# Patient Record
Sex: FEMALE | Race: OTHER | Employment: OTHER | URBAN - METROPOLITAN AREA
[De-identification: names, ages, dates, MRNs, and addresses within clinical notes are randomized per-mention and may not be internally consistent; named-entity substitution may affect disease eponyms.]

---

## 2018-06-25 ENCOUNTER — HOSPITAL ENCOUNTER (OUTPATIENT)
Dept: CT IMAGING | Age: 63
Discharge: HOME | End: 2018-06-25

## 2018-06-25 DIAGNOSIS — Z13.6 SCREENING FOR CARDIOVASCULAR CONDITION: ICD-10-CM

## 2018-06-25 NOTE — PROGRESS NOTES
Pt seen at [facility] for [facility]:  PRELIMINARY SCORE= 10.19  BP= 122/84  [facility] labs as follows: Fasting  TC= 159  HDL= 51  LDL= 84  TG= 121  GLUCOSE= 93  All results and risk factors discussed with patient; all questions and concerns addressed.   Educ

## 2023-04-28 ENCOUNTER — ORDER TRANSCRIPTION (OUTPATIENT)
Dept: ADMINISTRATIVE | Facility: HOSPITAL | Age: 68
End: 2023-04-28

## 2023-04-28 DIAGNOSIS — Z13.6 SCREENING FOR CARDIOVASCULAR CONDITION: Primary | ICD-10-CM

## 2023-06-06 ENCOUNTER — HOSPITAL ENCOUNTER (OUTPATIENT)
Dept: CT IMAGING | Age: 68
Discharge: HOME | End: 2023-06-06
Attending: FAMILY MEDICINE

## 2023-06-06 DIAGNOSIS — Z13.6 SCREENING FOR CARDIOVASCULAR CONDITION: ICD-10-CM

## 2023-11-14 ENCOUNTER — HOSPITAL ENCOUNTER (OUTPATIENT)
Dept: ULTRASOUND IMAGING | Age: 68
Discharge: HOME | End: 2023-11-14
Attending: FAMILY MEDICINE
Payer: MEDICARE

## 2023-11-14 DIAGNOSIS — R19.7 DIARRHEA, UNSPECIFIED TYPE: ICD-10-CM

## 2023-11-14 PROCEDURE — 76700 US EXAM ABDOM COMPLETE: CPT | Performed by: FAMILY MEDICINE

## 2023-11-15 ENCOUNTER — OFFICE VISIT (OUTPATIENT)
Dept: GASTROENTEROLOGY | Facility: CLINIC | Age: 68
End: 2023-11-15

## 2023-11-15 ENCOUNTER — TELEPHONE (OUTPATIENT)
Dept: GASTROENTEROLOGY | Facility: CLINIC | Age: 68
End: 2023-11-15

## 2023-11-15 VITALS
SYSTOLIC BLOOD PRESSURE: 154 MMHG | BODY MASS INDEX: 40.18 KG/M2 | HEART RATE: 72 BPM | HEIGHT: 66 IN | WEIGHT: 250 LBS | DIASTOLIC BLOOD PRESSURE: 89 MMHG

## 2023-11-15 DIAGNOSIS — R93.89 ABNORMAL ULTRASOUND: ICD-10-CM

## 2023-11-15 DIAGNOSIS — Z12.11 COLON CANCER SCREENING: Primary | ICD-10-CM

## 2023-11-15 DIAGNOSIS — K21.9 GASTROESOPHAGEAL REFLUX DISEASE, UNSPECIFIED WHETHER ESOPHAGITIS PRESENT: ICD-10-CM

## 2023-11-15 DIAGNOSIS — K76.0 HEPATIC STEATOSIS: ICD-10-CM

## 2023-11-15 DIAGNOSIS — R19.7 DIARRHEA, UNSPECIFIED TYPE: ICD-10-CM

## 2023-11-15 DIAGNOSIS — K21.9 GASTROESOPHAGEAL REFLUX DISEASE WITHOUT ESOPHAGITIS: ICD-10-CM

## 2023-11-15 PROCEDURE — 99204 OFFICE O/P NEW MOD 45 MIN: CPT | Performed by: NURSE PRACTITIONER

## 2023-11-15 RX ORDER — ACETAMINOPHEN 160 MG
2000 TABLET,DISINTEGRATING ORAL DAILY
COMMUNITY

## 2023-11-15 RX ORDER — CHLORAL HYDRATE 500 MG
CAPSULE ORAL
COMMUNITY

## 2023-11-15 RX ORDER — CALCIPOTRIENE AND BETAMETHASONE DIPROPIONATE 50; .5 UG/G; MG/G
1 SUSPENSION TOPICAL DAILY
COMMUNITY
Start: 2020-12-22

## 2023-11-15 RX ORDER — VITS A,C,E/LUTEIN/MINERALS 300MCG-200
1 TABLET ORAL
COMMUNITY

## 2023-11-15 RX ORDER — ATORVASTATIN CALCIUM 20 MG/1
20 TABLET, FILM COATED ORAL DAILY
COMMUNITY
Start: 2023-09-21

## 2023-11-15 RX ORDER — OLMESARTAN MEDOXOMIL 40 MG/1
40 TABLET ORAL DAILY
COMMUNITY
Start: 2023-09-21

## 2023-11-15 RX ORDER — POLYETHYLENE GLYCOL 3350, SODIUM CHLORIDE, SODIUM BICARBONATE, POTASSIUM CHLORIDE 420; 11.2; 5.72; 1.48 G/4L; G/4L; G/4L; G/4L
POWDER, FOR SOLUTION ORAL
Qty: 4000 ML | Refills: 0 | Status: SHIPPED | OUTPATIENT
Start: 2023-11-15

## 2023-11-15 RX ORDER — BETAMETHASONE DIPROPIONATE 0.5 MG/G
1 CREAM TOPICAL DAILY
COMMUNITY
Start: 2020-12-22

## 2023-11-15 RX ORDER — ASPIRIN 81 MG/1
81 TABLET ORAL DAILY
COMMUNITY

## 2023-11-15 NOTE — TELEPHONE ENCOUNTER
Scheduled for: colonoscopy [address]   Provider Name: Dr Lacie Ling   Date: Jenn Dubon 3/19/2024   Location: [facility]   Sedation: MAC  Time: 12:30 pm   Prep: split trilyte   Meds/Allergies Reconciled?:  NKDA  Diagnosis with codes: colon screening Z12.11, GERD K21.9   Was patient informed to call insurance with codes (Y/N): Yes   Referral sent?:  Yes,  medicare  EM or [facility] notified?:  I sent an electronic request to Endo Scheduling and received a confirmation today. Medication Orders: Pt is aware to NOT take iron pills, herbal meds and diet supplements for 7 days before exam. Also to NOT take any form of alcohol, recreational drugs and any forms of ED meds 24 hours before exam.      Misc Orders:       Further instructions given by staff:  Instructions given in office and pt verbalized understanding

## 2023-11-15 NOTE — H&P
[facility], [facility] - Gastroenterology                                                                                                               Reason for consult: eval    Requesting physician or provider: Wandy Pulliam (Inactive)    Chief Complaint   Patient presents with    Colonoscopy Screening       HPI:   Christin Anguiano is a 70year old year-old female without significant PMhX:    she is here today for evaluation    Seen by pcp at [facility] 11/9/23    Ultrasound abd complete 11/14/23    Impression   CONCLUSION: Echogenic liver compatible with fatty infiltration. Common bile duct is borderline enlarged for age, possibly representing normal variation. Correlate for biliary lab abnormality. Otherwise unremarkable exam as above. Dictated by (CST): Brinda Peace MD on 11/14/2023 at 3:50 PM      labs 11/9/23:  Lipase, tsh, cbc normal  Mildly elevated alt  Normal ast, alp, bili  Normal renal function    she has baseline of bm once every other day w/ occasional constipation. No h/o diarrhea. She reports diarrhea x last 2 weeks. Onset without change in diet, activity. Sx following using ibuprofen at night for knee pain. Had bm 3x in a day with urgency. No nocturnal bm. Tried imodium w/o improvement Tried pepto-bismol x 1 and diarrhea stopped sat. No brbpr and/or melena. Has abd cramping. Has since stopped ibuprofen. She has acid reflux improved with use of pepcid. Needs h2ra a couple of times a week and related to eating late at night. she denies dysphagia, odynophagia and/or globus. She denies epigastric pain, upper abd pain. she denies nausea and/or vomiting. she denies recent change in appetite and/or unintentional weight loss.     NSAIDS: asa 81 mg  Tobacco: no  Alcohol: occasional  Marijuana: no  Illicit drugs: no    No FH GI malignancy, ibd, celiac    No history of adverse reaction to sedation  No KHUSHBU  No anticoagulants  No pacemaker/defibrillator  No pain medications and/or sleep aides      Last colonoscopy: 2013 at [address] and denies having polyps  Last EGD: no    Wt Readings from Last 6 Encounters:   No data found for Wt        History, Medications, Allergies, ROS:      History reviewed. No pertinent past medical history. Past Surgical History:   Procedure Laterality Date    COLONOSCOPY        Family Hx:   Family History   Problem Relation Age of Onset    Cancer Father 67        bladder cancer      Social History:   Social History     Socioeconomic History    Marital status:    Tobacco Use    Smoking status: Former     Types: Cigarettes     Quit date: 1978     Years since quittin.9    Smokeless tobacco: Never   Substance and Sexual Activity    Alcohol use: Yes    Drug use: Never        Medications (Active prior to today's visit):  Current Outpatient Medications   Medication Sig Dispense Refill    atorvastatin 20 MG Oral Tab Take 1 tablet (20 mg total) by mouth daily. betamethasone dipropionate 0.05 % External Cream Apply 1 Application topically daily. Calcipotriene-Betameth Diprop 0.005-0.064 % External Suspension Apply 1 Application topically daily. Olmesartan Medoxomil 40 MG Oral Tab Take 1 tablet (40 mg total) by mouth daily. Multiple Vitamins-Minerals (WOMENS MULTI VITAMIN & MINERAL) Oral Tab Take by mouth. cholecalciferol 50 MCG (2000 UT) Oral Cap Take 1 capsule (2,000 Units total) by mouth daily. Omega-3 Fatty Acids (OMEGA-3 FISH OIL) 1000 MG Oral Cap Take by mouth. aspirin 81 MG Oral Tab EC Take 1 tablet (81 mg total) by mouth daily. multivitamin Oral Tab Take 1 tablet by mouth daily with breakfast.      PEG 3350-KCl-Na Bicarb-NaCl (TRILYTE) 420 g Oral Recon Soln Take prep as directed by gastro office. May substitute with Trilyte/generic equivalent if needed.  4000 mL 0       Allergies:  No Known Allergies    ROS:   CONSTITUTIONAL: negative for fevers, chills, sweats and weight loss  EYES Negative for red eyes, yellow eyes, changes in vision  HEENT: Negative for dysphagia and hoarseness  RESPIRATORY: Negative for cough and shortness of breath  CARDIOVASCULAR: Negative for chest pain, palpitations  GASTROINTESTINAL: See HPI  GENITOURINARY: Negative for dysuria and frequency  MUSCULOSKELETAL: Negative for arthralgias and myalgias  NEUROLOGICAL: Negative for dizziness and headaches  BEHAVIOR/PSYCH: Negative for anxiety and poor appetite    PHYSICAL EXAM:   Blood pressure 154/89, pulse 72, height 5' 6\" (1.676 m). GEN: WD/WN, NAD  HEENT: Supple symmetrical, trachea midline  CV: RRR, the extremities are warm and well perfused   LUNGS: No increased work of breathing  ABDOMEN: No scars, normal bowel sounds, soft, non-tender, non-distended no rebound or guarding, no masses, no hepatomegaly  MSK: No redness, no warmth, no swelling of joints  SKIN: No jaundice, no erythema, no rashes  HEMATOLOGIC: No bleeding, no bruising  NEURO: Alert and interactive, normal gait    Labs/Imaging/Procedures:     Patient's pertinent labs and imaging were reviewed and discussed with patient today. .  ASSESSMENT/PLAN:   Donna Bishop is a 70year old year-old female without significant PMhX:    #diarrhea  #crc screening  Symptoms x last 2 weeks and seemingly related to nsaid use. Sx improved with use of pepto-bismuth. No brbpr, melena. No fhx gi malignancy, ibd, celiac. Last cln 2013 and due for screening, Contact office if return of symptoms to consider need for further w/u. #gerd  Reflux related to late night eating and improved with prn pepcid. No dysphagia, vomiting, unintentional weight loss. Noted ct calcium scoring with incidental finding of small hh and distal lateral thickening possibly related to esophagitis. She has not had egd and we discussed considering procedure at time of cln to eval for reflux change. She is agreeable.      #Hepatic steatosis  #abnormal ultrasound  Fatty liver on ultrasound as well as prominence of GB, CBD borderline enlarged. LfTs normal.  She denies upper abd pain. Diarrhea seemingly resolved. No significant alcohol use. We discussed considering  MRCP to eval biliary tree, pancreas. She is asymptomatic and labs christiana and electing to wait and watch at this time. Plan as below.    -contact office if return or new symptoms to consider need for further work-up  -avoid nsaids  -avoid late night  -reflux diet modification  -pepid as needed (consider need for ppi pending egd results)  -low-fat diet  -healthy bmi  -monitor cholesterol, blood sugar. Liver function testing with pcp and consider need for further work-up    1. Schedule colonoscopy/egd with MAC w/ Dr. Lacie Ling or Dr. Bryant Lake [Diagnosis:crc screening, gerd ]    2.  bowel prep from pharmacy (split trilyte)    3. Continue all medications prior to procedure    4. Read all bowel prep instructions carefully    5. AVOID seeds, nuts, popcorn, raw fruits and vegetables (cooked is okay) for 2-3 days before procedure    6. You MAY need to go for COVID testing 72 hours before procedure. The testing team will call you a few days before your procedure to discuss with you if testing is required. If you are asked to go for COVID testing and do not completed the test, the procedure cannot be performed. 7. If you start any NEW medication after your visit today, please notify us. Certain medications will need to be held before the procedure, or the procedure cannot be performed.     >>>Please note: if you were prescribed Suprep for the bowel prep and it is too expensive or not covered by insurance, it is okay to substitute Trilyte (or any similar generic prep). This can be done by notifying the pharmacy or calling our office. Orders This Visit:  No orders of the defined types were placed in this encounter.       Meds This Visit:  Requested Prescriptions     Signed Prescriptions Disp Refills    PEG 3350-KCl-Na Bicarb-NaCl (TRILYTE) 420 g Oral Recon Soln 4000 mL 0     Sig: Take prep as directed by gastro office. May substitute with Trilyte/generic equivalent if needed. Imaging & Referrals:  None    ENDOSCOPIC RISK BENEFIT DISCUSSION: I described the procedure in great detail with the patient. I discussed the risks and benefits, including but not limited to: bleeding, perforation, infection, anesthesia complications, and even death. Patient will be NPO after midnight and will have a person physically present at time of pick-up to drive patient home. Patient verbalized understanding and agrees to proceed with procedure as planned. Tio Tejada. Alejandro Mayo, APRN   11/15/2023        This note was partially prepared using [facility] [employer] voice recognition dictation software. As a result, errors may occur. When identified, these errors have been corrected.  While every attempt is made to correct errors during dictation, discrepancies may still exist.

## 2024-01-16 ENCOUNTER — OFFICE VISIT (OUTPATIENT)
Dept: GASTROENTEROLOGY | Facility: CLINIC | Age: 69
End: 2024-01-16
Payer: MEDICARE

## 2024-01-16 VITALS
SYSTOLIC BLOOD PRESSURE: 149 MMHG | WEIGHT: 211 LBS | BODY MASS INDEX: 33.91 KG/M2 | DIASTOLIC BLOOD PRESSURE: 83 MMHG | HEART RATE: 83 BPM | HEIGHT: 66 IN

## 2024-01-16 DIAGNOSIS — R19.7 DIARRHEA, UNSPECIFIED TYPE: Primary | ICD-10-CM

## 2024-01-16 DIAGNOSIS — R93.89 ABNORMAL FINDING ON IMAGING: ICD-10-CM

## 2024-01-16 PROCEDURE — 99214 OFFICE O/P EST MOD 30 MIN: CPT | Performed by: INTERNAL MEDICINE

## 2024-01-16 NOTE — PATIENT INSTRUCTIONS
PLAN    - Please continue the low-FODMAPs diet    - OK to continue kaopectate as needed    - You can try Citrucel or Benefiber powder daily to help form up the stool    - We will try to expedite your procedures

## 2024-01-16 NOTE — H&P
[facility] - Gastroenterology                                                                                                                 Chief Complaint   Patient presents with    Follow - Up       HPI:   Suzanne Franco is a 68 year old year-old female here for the following:    Pt has been having diarrhea since 11/2023.  She started the low FODMAPs diet more strictly 1.5 weeks ago, and her abdominal gurgling has resolved and her stools consistency is more formed but not normal yet.  Pt was moving her bowels twice a day, usually in the morning, and stools were liquid and explosive.  Then, the rest of the day she's fine.  Pt has tried to reduce dairy in her diet but is still taking cheese.      Pt has lost 30 lbs since symptoms started and is back to her pre-pandemic weight.    Denies family h/o celiac disease, IBD, or CRC.        Ultrasound abd complete 11/14/23     Impression   CONCLUSION: Echogenic liver compatible with fatty infiltration.  Common bile duct is borderline enlarged for age, possibly representing normal variation.  Correlate for biliary lab abnormality.  Otherwise unremarkable exam as above.     Dictated by (CST): Dale Larsen MD on 11/14/2023 at 3:50 PM       Labs 11/9/23:  Lipase, tsh, cbc normal  Mildly elevated alt  Normal ast, alp, bili  Normal renal function     Last colonoscopy: 2013 at [facility] and denies having polyps  Last EGD: no         Soc:  -denies smoking  -denies heavy Etoh  -no illicit drug use    Wt Readings from Last 6 Encounters:   01/16/24 211 lb (95.7 kg)   11/15/23 250 lb (113.4 kg)    - pt wasn't actually weighed 11/2023 - that was reported.    History, Medications, Allergies, ROS:      History reviewed. No pertinent past medical history.   Past Surgical History:   Procedure Laterality Date    COLONOSCOPY        Family Hx:   Family History   Problem Relation Age of Onset     Cancer Father 72        bladder cancer      Social History:   Social History     Socioeconomic History    Marital status:    Tobacco Use    Smoking status: Former     Types: Cigarettes     Quit date: 1978     Years since quittin.0    Smokeless tobacco: Never   Substance and Sexual Activity    Alcohol use: Yes    Drug use: Never        Medications (Active prior to today's visit):  Current Outpatient Medications   Medication Sig Dispense Refill    atorvastatin 20 MG Oral Tab Take 1 tablet (20 mg total) by mouth daily.      betamethasone dipropionate 0.05 % External Cream Apply 1 Application topically daily.      Calcipotriene-Betameth Diprop 0.005-0.064 % External Suspension Apply 1 Application topically daily.      Multiple Vitamins-Minerals (WOMENS MULTI VITAMIN & MINERAL) Oral Tab Take by mouth.      cholecalciferol 50 MCG (2000 UT) Oral Cap Take 1 capsule (2,000 Units total) by mouth daily.      Omega-3 Fatty Acids (OMEGA-3 FISH OIL) 1000 MG Oral Cap Take by mouth.      aspirin 81 MG Oral Tab EC Take 1 tablet (81 mg total) by mouth daily.      multivitamin Oral Tab Take 1 tablet by mouth daily with breakfast.      PEG 3350-KCl-Na Bicarb-NaCl (TRILYTE) 420 g Oral Recon Soln Take prep as directed by gastro office. May substitute with Trilyte/generic equivalent if needed. 4000 mL 0    Olmesartan Medoxomil 40 MG Oral Tab Take 1 tablet (40 mg total) by mouth daily. (Patient not taking: Reported on 2024)         Allergies:  No Known Allergies    ROS:   CONSTITUTIONAL:  negative for fevers, rigors  EYES:  negative for diplopia   RESPIRATORY:  negative for severe shortness of breath  CARDIOVASCULAR:  negative for crushing sub-sternal chest pain  GASTROINTESTINAL:  see HPI  GENITOURINARY:  negative for dysuria or gross hematuria  INTEGUMENT/BREAST:  SKIN:  negative for jaundice   ALLERGIC/IMMUNOLOGIC:  negative for hay fever  ENDOCRINE:  negative for cold intolerance and heat  intolerance  MUSCULOSKELETAL:  negative for joint effusion/severe erythema  BEHAVIOR/PSYCH:  negative for psychotic behavior      PHYSICAL EXAM:   Blood pressure 149/83, pulse 83, height 5' 6\" (1.676 m), weight 211 lb (95.7 kg).    GEN - Patient appears comfortable and in no acute discomfort  ENT - MMM  EYES - the sclera appears anicteric  CV - no edema  RESP -  No increased work of breathing  ABDOMEN - soft, non-tender exam in all quadrants without rigidity or guarding, non-distended, no abnormal bowel sounds noted, no masses are palpated  SKIN - No jaundice  NEURO - Alert and appropriate, and gross movements of extremities normal  PSYCH - normal affect, non-agitated      Labs/Imaging:     Patient's pertinent labs and imaging were reviewed and discussed with patient today.      .  ASSESSMENT/PLAN:   Suzanne Franco is a 68 year old year-old female here for the following:    Diarrhea, change in bowel habits - etiology unclear. Stool testing at [place] was unremarkable.  Scheduled for EGD/CLN in March and leaving for [place] in February. Symptoms have slowed down but still persistent since going on the low FODMAPs diet.  Will try to expedite her scopes to r/o celiac disease, microscopic colitis, and/or IBD.      Abnormal CT, esophagus - pt with mild, intermittent reflux symptoms and CT calcium scoring showed distal esophageal thickening likely related to esophagitis and a hiatal hernia.  Planning EGD as above for further evaluation.  If there is e/o esophagitis, will plan for PPI therapy.      Hepatic steatosis on US, mildly elevated ALT likely related to fatty liver disease - mildly elevated ALT noted on blood work 11/2023 and PCP was going to recheck labs.  Recommend follow up if ALT does nto normalize.  US abd showed findings c/w hepatic steatosis and borderline CBD dilation.  Pt deferring MRCP.      Recommend    - EGD and CLN with [facility] with biopsies of at least the duodenum and colon    - CLD the day prior, NPO after  midnight, split dose golytely    - in the interim, OK to cont low-FODMAPs diet, PRN keopectate, and fiber supplementation to bulk up stools    - Follow up with Nait Miranda NP after the scopes to determine next steps.      EGD and Colonoscopy consent: I have discussed the risks, benefits, and alternatives to colonoscopy and EGD with the patient/primary decision maker [who demonstrated understanding], including but not limited to the risks of bleeding, infection, pain, death, as well as the risks of anesthesia and perforation all leading to prolonged hospitalization, surgical intervention, or even death. I also mentioned the miss rate of EGD and colonoscopy of 5-10% in the best of all circumstances. The patient has agreed to sign an informed consent and elected to proceed with the procedures with possible intervention [i.e. polypectomy, stent placement, etc.] as indicated.      Orders This Visit:  No orders of the defined types were placed in this encounter.      Meds This Visit:  Requested Prescriptions      No prescriptions requested or ordered in this encounter       Imaging & Referrals:  None         Porsche Xavier MD          This note was partially prepared using [employer] voice recognition dictation software. As a result, errors may occur. When identified, these errors have been corrected. While every attempt is made to correct errors during dictation, discrepancies may still exist.

## 2024-01-17 ENCOUNTER — TELEPHONE (OUTPATIENT)
Dept: GASTROENTEROLOGY | Facility: CLINIC | Age: 69
End: 2024-01-17

## 2024-01-17 NOTE — TELEPHONE ENCOUNTER
Hi, Dr. Pearson had a slot open on 1/23 and is agreeable with doing this patient's EGD/CLN - she was previously scheduled for March and is having ongoing diarrhea.  Please call her to schedule if this slot is still open - thanks, SS.

## 2024-01-18 NOTE — TELEPHONE ENCOUNTER
Velma Arora, PONCHO  TE 11/25/2023     AB    1/17/24  3:28 PM  Note  Scheduled for: colonoscopy 80327/4538/EGD [id number]   Provider Name: Dr Barahona   Date: Tues 3/19/2024   To 2/5/2024  Location: [facility]   TO [facility]  Sedation: MAC  Time: 12:30 pm  TO 9:00 (pt is aware that [facility] will call the day before to confirm arrival time)  Prep: split trilyte   Meds/Allergies Reconciled?:  NKDA  Diagnosis with codes: colon screening Z12.11, GERD K21.9             Closing TE

## 2024-01-30 ENCOUNTER — TELEPHONE (OUTPATIENT)
Facility: CLINIC | Age: 69
End: 2024-01-30

## 2024-01-30 PROBLEM — E78.5 HYPERLIPIDEMIA: Status: ACTIVE | Noted: 2017-11-30

## 2024-01-30 PROBLEM — E55.9 VITAMIN D DEFICIENCY: Status: ACTIVE | Noted: 2023-11-10

## 2024-01-30 NOTE — TELEPHONE ENCOUNTER
Pt never received CLN prep instructions in mail - asking for them to be sent to [employer]

## 2024-01-31 NOTE — TELEPHONE ENCOUNTER
Called patient to inform prep instructions were sent via My Chart.  All questions were answered.

## 2024-02-05 PROBLEM — K64.8 INTERNAL HEMORRHOIDS: Status: ACTIVE | Noted: 2024-02-05

## 2024-02-05 PROBLEM — K22.9 IRREGULAR Z LINE OF ESOPHAGUS: Status: ACTIVE | Noted: 2024-02-05

## 2024-02-05 PROCEDURE — 88305 TISSUE EXAM BY PATHOLOGIST: CPT | Performed by: INTERNAL MEDICINE

## 2024-02-05 PROCEDURE — 88312 SPECIAL STAINS GROUP 1: CPT | Performed by: INTERNAL MEDICINE

## 2024-02-19 ENCOUNTER — TELEPHONE (OUTPATIENT)
Facility: CLINIC | Age: 69
End: 2024-02-19

## 2024-02-19 RX ORDER — PANTOPRAZOLE SODIUM 40 MG/1
40 TABLET, DELAYED RELEASE ORAL
Qty: 90 TABLET | Refills: 3 | Status: SHIPPED | OUTPATIENT
Start: 2024-02-19 | End: 2025-02-13

## 2024-02-19 NOTE — TELEPHONE ENCOUNTER
Snapshot updated.    1 year EGD recall entered into patient outreach in [facility].  Next EGD will be due 2/5/2025.    See other telephone encounter from 2/19/2024 for the colonoscopy recall

## 2024-02-19 NOTE — TELEPHONE ENCOUNTER
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 This process is called intestinal metaplasia.  -No signs or symptoms are associated with Robert’s esophagus, but it is commonly found in people with gastroesophageal reflux disease (GERD). A small number of people with Robert’s esophagus develop a rare but aggressive type of cancer of the esophagus.  -Typically, before esophageal cancer develops, precancerous cells appear in the Robert’s tissue. This condition is called dysplasia and can be seen only through biopsies.  Multiple biopsies must be taken because dysplasia can be missed in a single biopsy.   -Periodic endoscopic examinations with biopsies to look for early warning signs of cancer are generally recommended for people who have Robert’s esophagus. This approach is called surveillance. We will need to repeat your endoscopy in 1 year and then every 3 years thereafter to check it for early signs of a precancerous change (dysplasia). Even if dysplasia develops there are ways to treat it to decrease your chance of getting cancer.  -Please continue to take your reflux medications as this may help reduce the risk of cancer as well. If you want to read more about Robert’s esophagus, go to [email or web address] and search ‘robert’s esophagus’.       GI staff:  -recall EGD in 1 year  -recall CLN In 10 years

## 2024-02-19 NOTE — TELEPHONE ENCOUNTER
Health Maintenance Updated.    10 year colonoscopy recall entered into patient outreach in [facility].  Next colonoscopy will be due 2/5/2034

## 2024-03-20 RX ORDER — PANTOPRAZOLE SODIUM 40 MG/1
40 TABLET, DELAYED RELEASE ORAL
Qty: 90 TABLET | Refills: 3 | Status: SHIPPED | OUTPATIENT
Start: 2024-03-20 | End: 2025-03-15

## 2024-03-20 NOTE — TELEPHONE ENCOUNTER
Current Outpatient Medications   Medication Sig Dispense Refill    pantoprazole 40 MG Oral Tab EC Take 1 tablet (40 mg total) by mouth every morning before breakfast. 90 tablet 3

## 2024-03-20 NOTE — TELEPHONE ENCOUNTER
Dr. Barahona,    I spoke to patient, she asked if we can send a new prescription to [facility] since her [facility] location closed.     She has plenty now but can coordinate when to have it delivered when she is about to run out.    Order pended below, please advise. Thank you.

## 2024-11-05 ENCOUNTER — LAB ENCOUNTER (OUTPATIENT)
Dept: LAB | Age: 69
End: 2024-11-05
Attending: FAMILY MEDICINE
Payer: MEDICARE

## 2024-11-05 ENCOUNTER — OFFICE VISIT (OUTPATIENT)
Dept: FAMILY MEDICINE CLINIC | Facility: CLINIC | Age: 69
End: 2024-11-05
Payer: MEDICARE

## 2024-11-05 VITALS
HEART RATE: 76 BPM | DIASTOLIC BLOOD PRESSURE: 82 MMHG | HEIGHT: 66 IN | SYSTOLIC BLOOD PRESSURE: 128 MMHG | WEIGHT: 240 LBS | BODY MASS INDEX: 38.57 KG/M2 | TEMPERATURE: 97 F

## 2024-11-05 DIAGNOSIS — E66.9 OBESITY (BMI 30-39.9): ICD-10-CM

## 2024-11-05 DIAGNOSIS — I10 ESSENTIAL HYPERTENSION, BENIGN: ICD-10-CM

## 2024-11-05 DIAGNOSIS — E78.00 HYPERCHOLESTEREMIA: ICD-10-CM

## 2024-11-05 DIAGNOSIS — E55.9 VITAMIN D DEFICIENCY: ICD-10-CM

## 2024-11-05 DIAGNOSIS — L40.8 OTHER PSORIASIS: ICD-10-CM

## 2024-11-05 DIAGNOSIS — Z12.83 SKIN CANCER SCREENING: ICD-10-CM

## 2024-11-05 DIAGNOSIS — K21.9 GASTROESOPHAGEAL REFLUX DISEASE, UNSPECIFIED WHETHER ESOPHAGITIS PRESENT: ICD-10-CM

## 2024-11-05 DIAGNOSIS — I10 ESSENTIAL HYPERTENSION, BENIGN: Primary | ICD-10-CM

## 2024-11-05 LAB
ALBUMIN SERPL-MCNC: 4.3 G/DL (ref 3.2–4.8)
ALBUMIN/GLOB SERPL: 1.5 {RATIO} (ref 1–2)
ALP LIVER SERPL-CCNC: 83 U/L
ALT SERPL-CCNC: 17 U/L
ANION GAP SERPL CALC-SCNC: 7 MMOL/L (ref 0–18)
AST SERPL-CCNC: 23 U/L (ref ?–34)
ATRIAL RATE: 66 BPM
BASOPHILS # BLD AUTO: 0.06 X10(3) UL (ref 0–0.2)
BASOPHILS NFR BLD AUTO: 0.9 %
BILIRUB SERPL-MCNC: 0.7 MG/DL (ref 0.2–1.1)
BUN BLD-MCNC: 12 MG/DL (ref 9–23)
BUN/CREAT SERPL: 13.6 (ref 10–20)
CALCIUM BLD-MCNC: 9.6 MG/DL (ref 8.7–10.4)
CHLORIDE SERPL-SCNC: 107 MMOL/L (ref 98–112)
CHOLEST SERPL-MCNC: 169 MG/DL (ref ?–200)
CO2 SERPL-SCNC: 27 MMOL/L (ref 21–32)
CREAT BLD-MCNC: 0.88 MG/DL
DEPRECATED RDW RBC AUTO: 42.8 FL (ref 35.1–46.3)
EGFRCR SERPLBLD CKD-EPI 2021: 71 ML/MIN/1.73M2 (ref 60–?)
EOSINOPHIL # BLD AUTO: 0.32 X10(3) UL (ref 0–0.7)
EOSINOPHIL NFR BLD AUTO: 5 %
ERYTHROCYTE [DISTWIDTH] IN BLOOD BY AUTOMATED COUNT: 13 % (ref 11–15)
FASTING PATIENT LIPID ANSWER: YES
FASTING STATUS PATIENT QL REPORTED: YES
GLOBULIN PLAS-MCNC: 2.8 G/DL (ref 2–3.5)
GLUCOSE BLD-MCNC: 97 MG/DL (ref 70–99)
HCT VFR BLD AUTO: 39.9 %
HDLC SERPL-MCNC: 52 MG/DL (ref 40–59)
HGB BLD-MCNC: 13.6 G/DL
IMM GRANULOCYTES # BLD AUTO: 0 X10(3) UL (ref 0–1)
IMM GRANULOCYTES NFR BLD: 0 %
LDLC SERPL CALC-MCNC: 96 MG/DL (ref ?–100)
LYMPHOCYTES # BLD AUTO: 2.47 X10(3) UL (ref 1–4)
LYMPHOCYTES NFR BLD AUTO: 38.8 %
MCH RBC QN AUTO: 31.1 PG (ref 26–34)
MCHC RBC AUTO-ENTMCNC: 34.1 G/DL (ref 31–37)
MCV RBC AUTO: 91.1 FL
MONOCYTES # BLD AUTO: 0.52 X10(3) UL (ref 0.1–1)
MONOCYTES NFR BLD AUTO: 8.2 %
NEUTROPHILS # BLD AUTO: 2.99 X10 (3) UL (ref 1.5–7.7)
NEUTROPHILS # BLD AUTO: 2.99 X10(3) UL (ref 1.5–7.7)
NEUTROPHILS NFR BLD AUTO: 47.1 %
NONHDLC SERPL-MCNC: 117 MG/DL (ref ?–130)
OSMOLALITY SERPL CALC.SUM OF ELEC: 292 MOSM/KG (ref 275–295)
P AXIS: 50 DEGREES
P-R INTERVAL: 182 MS
PLATELET # BLD AUTO: 196 10(3)UL (ref 150–450)
POTASSIUM SERPL-SCNC: 4.3 MMOL/L (ref 3.5–5.1)
PROT SERPL-MCNC: 7.1 G/DL (ref 5.7–8.2)
Q-T INTERVAL: 406 MS
QRS DURATION: 80 MS
QTC CALCULATION (BEZET): 425 MS
R AXIS: 21 DEGREES
RBC # BLD AUTO: 4.38 X10(6)UL
SODIUM SERPL-SCNC: 141 MMOL/L (ref 136–145)
T AXIS: 55 DEGREES
TRIGL SERPL-MCNC: 120 MG/DL (ref 30–149)
VENTRICULAR RATE: 66 BPM
VLDLC SERPL CALC-MCNC: 20 MG/DL (ref 0–30)
WBC # BLD AUTO: 6.4 X10(3) UL (ref 4–11)

## 2024-11-05 PROCEDURE — 93010 ELECTROCARDIOGRAM REPORT: CPT | Performed by: STUDENT IN AN ORGANIZED HEALTH CARE EDUCATION/TRAINING PROGRAM

## 2024-11-05 PROCEDURE — 36415 COLL VENOUS BLD VENIPUNCTURE: CPT | Performed by: FAMILY MEDICINE

## 2024-11-05 PROCEDURE — 80053 COMPREHEN METABOLIC PANEL: CPT | Performed by: FAMILY MEDICINE

## 2024-11-05 PROCEDURE — 93005 ELECTROCARDIOGRAM TRACING: CPT

## 2024-11-05 PROCEDURE — 99203 OFFICE O/P NEW LOW 30 MIN: CPT | Performed by: FAMILY MEDICINE

## 2024-11-05 PROCEDURE — 85025 COMPLETE CBC W/AUTO DIFF WBC: CPT | Performed by: FAMILY MEDICINE

## 2024-11-05 PROCEDURE — 80061 LIPID PANEL: CPT | Performed by: FAMILY MEDICINE

## 2024-11-05 RX ORDER — LOSARTAN POTASSIUM 50 MG/1
50 TABLET ORAL DAILY
Qty: 90 TABLET | Refills: 3 | Status: SHIPPED | OUTPATIENT
Start: 2024-11-05

## 2024-11-05 NOTE — PROGRESS NOTES
HPI:    Patient ID: Suzanne Franco is a 69 year old female.      HPI    Chief Complaint   Patient presents with    [facility]    Referral     For dermatologist Dr. Alfaro        Wt Readings from Last 6 Encounters:   11/05/24 240 lb (108.9 kg)   01/30/24 211 lb (95.7 kg)   01/16/24 211 lb (95.7 kg)   11/15/23 250 lb (113.4 kg)     BP Readings from Last 3 Encounters:   11/05/24 128/82   02/05/24 115/70   01/16/24 149/83     .  No kids.  Retired as  in 2014.'  Smoked 4-6 yrs and quit in 1978    Recent egd- showed barretts esophagus  Follow up egd 1 year    Follows fodmap diet, after she had diarrhea for few months last year       Review of Systems   Constitutional:  Negative for chills and fever.   Eyes:  Negative for visual disturbance.   Respiratory:  Negative for cough, shortness of breath and wheezing.    Cardiovascular:  Negative for chest pain, palpitations and leg swelling.   Genitourinary:  Negative for decreased urine volume and difficulty urinating.   Neurological:  Negative for dizziness, tremors, seizures, weakness, light-headedness, numbness and headaches.       /82   Pulse 76   Temp 97 °F (36.1 °C) (Temporal)   Ht 5' 6\" (1.676 m)   Wt 240 lb (108.9 kg)   BMI 38.74 kg/m²          Current Outpatient Medications   Medication Sig Dispense Refill    losartan 50 MG Oral Tab Take 1 tablet (50 mg total) by mouth daily. 90 tablet 3    pantoprazole 40 MG Oral Tab EC Take 1 tablet (40 mg total) by mouth every morning before breakfast. 90 tablet 3    atorvastatin 20 MG Oral Tab Take 1 tablet (20 mg total) by mouth daily.      Multiple Vitamins-Minerals (WOMENS MULTI VITAMIN & MINERAL) Oral Tab Take by mouth.      cholecalciferol 50 MCG (2000 UT) Oral Cap Take 1 capsule (2,000 Units total) by mouth daily.      Omega-3 Fatty Acids (OMEGA-3 FISH OIL) 1000 MG Oral Cap Take by mouth.      aspirin 81 MG Oral Tab EC Take 1 tablet (81 mg total) by mouth daily.      betamethasone  dipropionate 0.05 % External Cream Apply 1 Application topically daily.      Calcipotriene-Betameth Diprop 0.005-0.064 % External Suspension Apply 1 Application topically daily.       Allergies:Allergies[1]   PHYSICAL EXAM:     Chief Complaint   Patient presents with    \[facility]\"" [facility]    Referral     For dermatologist Dr. Alfaro       Physical Exam  Vitals and nursing note reviewed.   Constitutional:       Appearance: She is well-developed.   Eyes:      Pupils: Pupils are equal, round, and reactive to light.   Neck:      Thyroid: No thyromegaly.   Cardiovascular:      Rate and Rhythm: Normal rate and regular rhythm.      Heart sounds: No murmur heard.  Pulmonary:      Effort: Pulmonary effort is normal.      Breath sounds: Normal breath sounds. No wheezing.   Abdominal:      Palpations: There is no mass.      Tenderness: There is no abdominal tenderness. There is no right CVA tenderness or left CVA tenderness.   Musculoskeletal:      Cervical back: Normal range of motion and neck supple.      Right lower leg: No edema.      Left lower leg: No edema.   Skin:     General: Skin is warm and dry.      Findings: No rash.   Neurological:      Mental Status: She is alert and oriented to person, place, and time.                ASSESSMENT/PLAN:     Encounter Diagnoses   Name Primary?    Essential hypertension, benign Yes    Hypercholesteremia     Vitamin D deficiency     Other psoriasis     Gastroesophageal reflux disease, unspecified whether esophagitis present     Skin cancer screening     Obesity (BMI 30-39.9)        1. Essential hypertension, benign  Stable condition  Reviewed medications  Continue current medication management   All questions answered to the best of my ability.    - Comp Metabolic Panel (14)  - losartan 50 MG Oral Tab; Take 1 tablet (50 mg total) by mouth daily.  Dispense: 90 tablet; Refill: 3  - EKG 12 Lead; Future    2. Hypercholesteremia  Check labs   - Comp Metabolic Panel (14)  - Lipid Panel  - EKG  12 Lead; Future    3. Vitamin D deficiency  replaced    4. Other psoriasis    - DERM - INTERNAL    5. Gastroesophageal reflux disease, unspecified whether esophagitis present  Stable  Follow up egd 1 year  - CBC With Differential With Platelet    6. Skin cancer screening    - DERM - INTERNAL    7. Obesity (BMI 30-39.9)  Wt Readings from Last 6 Encounters:   11/05/24 240 lb (108.9 kg)   01/30/24 211 lb (95.7 kg)   01/16/24 211 lb (95.7 kg)   11/15/23 250 lb (113.4 kg)       Highly recommend to lose weight.  Discussed good dietary and eating habits as well as increasing vegetable and fruit intake.  Recommending avoiding foods high in fat content.  Recommend exercising at least 30-40 minutes 5-6 days a week.  Avoid skipping meals.  Making healthy choices for snacks and also limiting sugary beverages.        Orders Placed This Encounter   Procedures    Comp Metabolic Panel (14)    Lipid Panel    CBC With Differential With Platelet         The above note was creating using Dragon speech recognition technology. Please excuse any typos    Meds This Visit:  Requested Prescriptions     Signed Prescriptions Disp Refills    losartan 50 MG Oral Tab 90 tablet 3     Sig: Take 1 tablet (50 mg total) by mouth daily.       Imaging & Referrals:  DERM - INTERNAL  EKG 12-LEAD       ID#[id number]       [1] No Known Allergies

## 2024-11-27 ENCOUNTER — TELEPHONE (OUTPATIENT)
Facility: CLINIC | Age: 69
End: 2024-11-27

## 2024-11-27 NOTE — TELEPHONE ENCOUNTER
Patient outreach message received:    1 year EGD recall entered into patient outreach in [employer]. Next EGD will be due 2/5/2025.     Recall reminder letter sent out to patient via [employer].

## 2024-12-02 ENCOUNTER — OFFICE VISIT (OUTPATIENT)
Dept: DERMATOLOGY CLINIC | Facility: CLINIC | Age: 69
End: 2024-12-02
Payer: MEDICARE

## 2024-12-02 DIAGNOSIS — D22.9 MULTIPLE NEVI: ICD-10-CM

## 2024-12-02 DIAGNOSIS — L40.0 PSORIASIS VULGARIS: Primary | ICD-10-CM

## 2024-12-02 DIAGNOSIS — L82.1 SK (SEBORRHEIC KERATOSIS): ICD-10-CM

## 2024-12-02 DIAGNOSIS — D23.9 BENIGN NEOPLASM OF SKIN, UNSPECIFIED LOCATION: ICD-10-CM

## 2024-12-02 DIAGNOSIS — L81.4 LENTIGO: ICD-10-CM

## 2024-12-02 PROCEDURE — 99203 OFFICE O/P NEW LOW 30 MIN: CPT | Performed by: DERMATOLOGY

## 2024-12-03 ENCOUNTER — OFFICE VISIT (OUTPATIENT)
Dept: FAMILY MEDICINE CLINIC | Facility: CLINIC | Age: 69
End: 2024-12-03

## 2024-12-03 VITALS
HEART RATE: 76 BPM | WEIGHT: 240 LBS | HEIGHT: 66 IN | DIASTOLIC BLOOD PRESSURE: 78 MMHG | SYSTOLIC BLOOD PRESSURE: 123 MMHG | BODY MASS INDEX: 38.57 KG/M2

## 2024-12-03 DIAGNOSIS — I10 ESSENTIAL HYPERTENSION, BENIGN: ICD-10-CM

## 2024-12-03 DIAGNOSIS — K22.9 IRREGULAR Z LINE OF ESOPHAGUS: ICD-10-CM

## 2024-12-03 DIAGNOSIS — M85.852 OSTEOPENIA OF NECK OF LEFT FEMUR: ICD-10-CM

## 2024-12-03 DIAGNOSIS — E66.9 OBESITY (BMI 30-39.9): ICD-10-CM

## 2024-12-03 DIAGNOSIS — Z11.59 NEED FOR HEPATITIS C SCREENING TEST: ICD-10-CM

## 2024-12-03 DIAGNOSIS — K21.9 GASTROESOPHAGEAL REFLUX DISEASE, UNSPECIFIED WHETHER ESOPHAGITIS PRESENT: ICD-10-CM

## 2024-12-03 DIAGNOSIS — Z78.0 POSTMENOPAUSAL: ICD-10-CM

## 2024-12-03 DIAGNOSIS — L40.8 OTHER PSORIASIS: ICD-10-CM

## 2024-12-03 DIAGNOSIS — Z00.00 ENCOUNTER FOR ANNUAL HEALTH EXAMINATION: Primary | ICD-10-CM

## 2024-12-03 DIAGNOSIS — E55.9 VITAMIN D DEFICIENCY: ICD-10-CM

## 2024-12-03 DIAGNOSIS — E78.00 HYPERCHOLESTEREMIA: ICD-10-CM

## 2024-12-03 PROBLEM — E78.5 HYPERLIPIDEMIA: Status: RESOLVED | Noted: 2017-11-30 | Resolved: 2024-12-03

## 2024-12-03 PROBLEM — K64.8 INTERNAL HEMORRHOIDS: Status: RESOLVED | Noted: 2024-02-05 | Resolved: 2024-12-03

## 2024-12-03 PROCEDURE — G0439 PPPS, SUBSEQ VISIT: HCPCS | Performed by: FAMILY MEDICINE

## 2024-12-03 RX ORDER — UBIDECARENONE 75 MG
250 CAPSULE ORAL DAILY
COMMUNITY

## 2024-12-03 NOTE — PROGRESS NOTES
Subjective:   Suzanne Franco is a 69 year old female who presents for a Medicare Wellness Visit charge within the last 11 months and Patient may not meet criteria for AWV: Please evaluate for correct coding and scheduled follow up of multiple significant but stable problems.     Wt Readings from Last 6 Encounters:   12/03/24 240 lb (108.9 kg)   11/05/24 240 lb (108.9 kg)   01/30/24 211 lb (95.7 kg)   01/16/24 211 lb (95.7 kg)   11/15/23 250 lb (113.4 kg)     BP Readings from Last 3 Encounters:   12/03/24 123/78   11/05/24 128/82   02/05/24 115/70         History/Other:   Fall Risk Assessment:   She has been screened for Falls and is High Risk. Fall Prevention information provided to patient in After Visit Summary.    Do you feel unsteady when standing or walking?: No  Do you worry about falling?: No  Have you fallen in the past year?: Yes  How many times have you fallen?: 1  Were you injured?: No     Cognitive Assessment:   She had a completely normal cognitive assessment - see flowsheet entries     Functional Ability/Status:   Suzanne Franco has some abnormal functions as listed below:  She has Vision problems based on screening of functional status.       Depression Screening (PHQ):  PHQ-2 SCORE: 0  , done 12/3/2024            Advanced Directives:   She does NOT have a Living Will. [Do you have a living will?: No]  She does NOT have a Power of  for Health Care. [Do you have a healthcare power of ?: No]  Discussed Advance Care Planning with patient (and family/surrogate if present). Standard forms made available to patient in After Visit Summary.      Patient Active Problem List   Diagnosis    Essential hypertension, benign    Other psoriasis    Vitamin D deficiency    Irregular Z line of esophagus    Gastroesophageal reflux disease    Obesity (BMI 30-39.9)    Osteopenia of neck of left femur    Postmenopausal     Allergies:  She has No Known Allergies.    Current Medications:  Outpatient  Medications Marked as Taking for the 12/3/24 encounter (Office Visit) with Milton Frazier MD   Medication Sig    cyanocobalamin 100 MCG Oral Tab Take 2.5 tablets (250 mcg total) by mouth daily.    losartan 50 MG Oral Tab Take 1 tablet (50 mg total) by mouth daily.    pantoprazole 40 MG Oral Tab EC Take 1 tablet (40 mg total) by mouth every morning before breakfast.    atorvastatin 20 MG Oral Tab Take 1 tablet (20 mg total) by mouth daily.    Calcipotriene-Betameth Diprop 0.005-0.064 % External Suspension Apply 1 Application topically daily.    Multiple Vitamins-Minerals (WOMENS MULTI VITAMIN & MINERAL) Oral Tab Take by mouth.    cholecalciferol 50 MCG (2000 UT) Oral Cap Take 1 capsule (2,000 Units total) by mouth daily.    Omega-3 Fatty Acids (OMEGA-3 FISH OIL) 1000 MG Oral Cap Take by mouth.    aspirin 81 MG Oral Tab EC Take 1 tablet (81 mg total) by mouth daily.       Medical History:  She  has a past medical history of Lopez esophagus (), Essential hypertension, High blood pressure, High cholesterol, Hyperlipidemia, Screen for colon cancer (), and Visual impairment.  Surgical History:  She  has a past surgical history that includes colonoscopy (2024) and tonsillectomy.   Family History:  Her family history includes Cancer (age of onset: 72) in her father.  Social History:  She  reports that she quit smoking about 46 years ago. Her smoking use included cigarettes. She has never used smokeless tobacco. She reports current alcohol use of about 2.0 standard drinks of alcohol per week. She reports that she does not use drugs.    Tobacco:  She smoked tobacco in the past but quit greater than 12 months ago.  Social History     Tobacco Use   Smoking Status Former    Current packs/day: 0.00    Types: Cigarettes    Quit date: 1978    Years since quittin.9   Smokeless Tobacco Never          CAGE Alcohol Screen:   CAGE screening score of 0 on 12/3/2024, showing low risk of alcohol abuse.      Patient  Care Team:  Milton Frazier MD as PCP - General (Family Medicine)    Review of Systems     Negative     Objective:   Physical Exam  General Appearance:  Alert, cooperative, no distress, appears stated age   Head:  Normocephalic, without obvious abnormality, atraumatic   Eyes:  PERRL, conjunctiva/corneas clear, EOM's intact both eyes   Ears:  Normal TM's and external ear canals, both ears   Nose: Nares normal, septum midline,mucosa normal, no drainage or sinus tenderness   Throat: Lips, mucosa, and tongue normal; teeth and gums normal   Neck: Supple, symmetrical, trachea midline, no adenopathy;  thyroid: not enlarged, symmetric, no tenderness/mass/nodules; no carotid bruit or JVD   Back:   Symmetric, no curvature, ROM normal, no CVA tenderness   Lungs:   Clear to auscultation bilaterally, respirations unlabored   Heart:  Regular rate and rhythm, S1 and S2 normal, no murmur, rub, or gallop   Abdomen:   Soft, non-tender, bowel sounds active all four quadrants,  no masses, no organomegaly   Pelvic: Deferred   Extremities: Extremities normal, atraumatic, no cyanosis or edema   Pulses: 2+ and symmetric   Skin: Skin color, texture, turgor normal, no rashes or lesions   Lymph nodes: Cervical, supraclavicular, and axillary nodes normal   Neurologic: Normal   , Breasts:  normal appearance, no masses or tenderness, Inspection negative, No nipple retraction or dimpling, No nipple discharge or bleeding, No axillary or supraclavicular adenopathy, Normal to palpation without dominant masses, Taught monthly breast self examination, and Pelvic: cervix normal in appearance, external genitalia normal, no adnexal masses or tenderness, no cervical motion tenderness, rectovaginal septum normal, uterus normal size, shape, and consistency, and vagina normal without discharge    /78   Pulse 76   Ht 5' 6\" (1.676 m)   Wt 240 lb (108.9 kg)   BMI 38.74 kg/m²  Estimated body mass index is 38.74 kg/m² as calculated from the following:     Height as of this encounter: 5' 6\" (1.676 m).    Weight as of this encounter: 240 lb (108.9 kg).    Medicare Hearing Assessment:   Hearing Screening    Screening Method: Finger Rub  Finger Rub Result: Pass         Visual Acuity:   Right Eye Visual Acuity: Corrected Right Eye Chart Acuity: 20/13   Left Eye Visual Acuity: Corrected Left Eye Chart Acuity: 20/20   Both Eyes Visual Acuity: Corrected Both Eyes Chart Acuity: 20/13   Able To Tolerate Visual Acuity: Yes        Assessment & Plan:   Suzanne Franco is a 69 year old female who presents for a Medicare Assessment.     1. Encounter for annual health examination (Primary)  2. Need for hepatitis C screening test  -     HCV Antibody  3. Essential hypertension, benign  4. Hypercholesteremia  5. Gastroesophageal reflux disease, unspecified whether esophagitis present  -     GASTRO - INTERNAL  6. Obesity (BMI 30-39.9)  7. Vitamin D deficiency  8. Other psoriasis  9. Irregular Z line of esophagus  -     GASTRO - INTERNAL  10. Osteopenia of neck of left femur  -     XR DEXA BONE DENSITOMETRY (CPT=77080); Future; Expected date: 2024  11. Postmenopausal  -     XR DEXA BONE DENSITOMETRY (CPT=77080); Future; Expected date: 2024    1. Encounter for annual health examination      2. Need for hepatitis C screening test    - HCV AB for  1945 thru 1965 (Once in a lifetime)    3. Essential hypertension, benign  Stable condition  Reviewed medications  Continue current medication management   All questions answered to the best of my ability.      4. Hypercholesteremia  Stable condition  Reviewed medications  Continue current medication management   All questions answered to the best of my ability.      5. Gastroesophageal reflux disease, unspecified whether esophagitis present    - GASTRO - INTERNAL    6. Obesity (BMI 30-39.9)  Wt Readings from Last 6 Encounters:   24 240 lb (108.9 kg)   24 240 lb (108.9 kg)   24 211 lb (95.7 kg)   24 211 lb  (95.7 kg)   11/15/23 250 lb (113.4 kg)       Highly recommend to lose weight.  Discussed good dietary and eating habits as well as increasing vegetable and fruit intake.  Recommending avoiding foods high in fat content.  Recommend exercising at least 30-40 minutes 5-6 days a week.  Avoid skipping meals.  Making healthy choices for snacks and also limiting sugary beverages.      7. Vitamin D deficiency  replace    8. Other psoriasis      9. Irregular Z line of esophagus    - GASTRO - INTERNAL    10. Osteopenia of neck of left femur  Last dexa reviewed  Continue weight bearing exercise and optimize calcium/ vitamin d  supplementation   - XR DEXA BONE DENSITOMETRY (CPT=77080); Future    11. Postmenopausal    - XR DEXA BONE DENSITOMETRY (CPT=77080); Future    The patient indicates understanding of these issues and agrees to the plan.  Consult ordered.  Continue with current treatment plan.  Imaging studies ordered.  Lab work ordered.  Reinforced healthy diet, lifestyle, and exercise.      Return in about 6 months (around 6/3/2025) for Chronic problems.     Milton Frazier MD, 12/3/2024     Supplementary Documentation:   General Health:  In the past six months, have you lost more than 10 pounds without trying?: 2 - No  Has your appetite been poor?: No  Type of Diet: Balanced  How does the patient maintain a good energy level?: Daily Walks (and weights)  How would you describe your daily physical activity?: Moderate  How would you describe your current health state?: Good  How do you maintain positive mental well-being?: Visiting Family;Social Interaction;Puzzles;Games;Visiting Friends  On a scale of 0 to 10, with 0 being no pain and 10 being severe pain, what is your pain level?: 0 - (None)  In the past six months, have you experienced urine leakage?: 0-No  At any time do you feel concerned for the safety/well-being of yourself and/or your children, in your home or elsewhere?: No  Have you had any immunizations at another  office such as Influenza, Hepatitis B, Tetanus, or Pneumococcal?: No    Health Maintenance   Topic Date Due    Annual Physical  Never done    Mammogram  Never done    Zoster Vaccines (1 of 2) Never done    DEXA Scan  Never done    Colorectal Cancer Screening  02/05/2034    Influenza Vaccine  Completed    Annual Depression Screening  Completed    Fall Risk Screening (Annual)  Completed    Pneumococcal Vaccine: 65+ Years  Completed    COVID-19 Vaccine  Completed

## 2024-12-04 ENCOUNTER — TELEPHONE (OUTPATIENT)
Facility: CLINIC | Age: 69
End: 2024-12-04

## 2024-12-04 DIAGNOSIS — K22.70 BARRETT'S ESOPHAGUS WITHOUT DYSPLASIA: Primary | ICD-10-CM

## 2024-12-04 NOTE — TELEPHONE ENCOUNTER
ESOPHAGOGASTRODUODENOSCOPY (EGD) & COLONOSCOPY REPORT           Suzanne Franco      10/28/1955 Age 68 year old   PCP Lavonne Vallejo (Inactive) Endoscopist Christal Barahona MD      Date of procedure: 24     Location: [facility] ([facility])     Procedure: EGD w/cold biopsy & Colonoscopy w/cold biopsy     Pre-operative diagnosis: Screening, GERD     Post-operative diagnosis: Irregular z-line, internal hemorrhoids     Medications: MAC     Withdrawal time: 14 minutes     Complications: none     Procedure: Informed consent was obtained from the patient after the risks of the procedure were discussed, including but not limited to bleeding, perforation, aspiration, infection, or possibility of a missed lesion. We discussed the risks/benefits and alternatives to this procedure, as well as the planned sedation. EGD procedure: The patient was placed in the left lateral decubitus position and begun on continuous blood pressure pulse oximetry and EKG monitoring and this was maintained throughout the procedure. Once an adequate level of sedation was obtained a bite block was placed. Then the lubricated tip of the [id number] diagnostic video upper endoscope was inserted and advanced using direct visualization into the posterior pharynx and ultimately into the esophagus. Colonoscopy procedure: Once an adequate level of sedation was obtained a digital rectal exam was completed. Then the lubricated tip of the [id number] diagnostic video colonoscope was inserted and advanced without difficulty to the cecum using the CO2 insufflation technique. The cecum was identified by localizing the trifold, the appendix and the ileocecal valve. A routine second examination of the cecum/ascending colon was performed. Withdrawal was begun with thorough washing and careful examination of the colonic walls and folds. Photodocumentation was obtained. The bowel prep was good. Views of the colon were good  with washing. I then carefully withdrew the instrument from the patient who tolerated the procedure well.      Complications: None     EGD findings:       1. Esophagus: The squamocolumnar junction was noted at 40 cm and appeared mildly irregular without significant extension of salmon colored mucosa s/p biopsies of the z-line. . The diaphragmatic pinch was noted noted at 40 cm from the incisors. The esophageal mucosa appeared normal. There was no evidence of esophagitis.  2. Stomach: The stomach distended normally. Normal rugal folds were seen. The pylorus was patent. The gastric mucosa appeared normal s/p random biopsies.. Retroflexion revealed a normal fundus and a non-patulous cardia.   3. Duodenum: The duodenal mucosa appeared normal in the 1st and 2nd portion of the duodenum. S/p random biopsies.     Colonoscopy findings:     1. No polyp(s) noted.  2. Diverticulosis: none.  3. Terminal ileum: the visualized mucosa appeared normal.  4. The colonic mucosa throughout the colon showed normal vascular pattern, without evidence of angioectasias or inflammation. S/p random biopsies.  5. A retroflexed view of the rectum revealed small internal hemorrhoids.  6. FANY: normal rectal tone, no masses palpated.      Impression:  EGD shows no mass or ulcer, there was mildly irregular z-line.  Normal colonoscopy to the terminal ileum, other than small internal hemorrhoids. No findings of colitis, biopsies obtained to exclude microscopic colitis.      Recommend:  Await pathology. Repeat CLN in 10 years. If new signs or symptoms develop, colonoscopy may need to be repeated sooner.   High fiber diet.  Monitor for blood in the stool. If having more than just tinge of blood, call office or go to the ER.  Avoid caffeine, chocolate, peppermint, and alcohol. Also avoid lying down flat or in a recumbent position for 3 hours after a meal.         >>>If tissue was obtained and you have not received your pathology results either by phone  or letter within 2 weeks, please call our office at [phone].     Specimens: gastric, duodenal, esophageal, colon  Blood loss: <1 ml        ----------------------------------------------------------------------------------------------------------------------     INTERVAL FOR COLONOSCOPY:   - If there is no family history of colon cancer and no colon polyps identified in an adequately prepped colon - colonoscopy should be repeated in 10 years. Various factors should be considered regarding repeat colonoscopy - including co-morbid conditions, ability to tolerate procedure with advanced age, and desire for repeat testing.   - If no colon polyps were identified and a positive family history of colon cancer - the colonoscopy should be repeated in 5 years.   - If colon polyps were removed, the colonoscopy should be repeated sooner depending on size/type/location of polyp.             Electronically signed by ELISABETH Barahona MD at 2/5/2024  8:37 AM  Final Diagnosis:     A. Duodenum biopsy:  Multiple fragments of small bowel mucosa demonstrating no significant pathologic changes.  Normal villous architecture present.  No evidence of celiac sprue, granuloma, dysplasia, or malignancy is identified.     B. Gastric biopsy:   Fragments of gastric mucosa with moderate inactive chronic inflammation.  Diff Quik stain (with appropriate control reactivity) is negative for H pylori microorganisms.     C. Distal esophagus biopsy:  Multiple fragments of squamous and glandular type mucosa with mild acute and chronic inflammation, mild reflux esophagitis, and focal complete goblet cell intestinal metaplasia.  No evidence of dysplasia or malignancy is identified.   Diff-Quik stain (with appropriate control reactivity) is negative for Helicobacter pylori microorganisms.     D. Random colon biopsy:   Multiple fragments of colonic mucosa with mild nonspecific chronic inflammation, and lamina propria benign lymphoid aggregates.  No  evidence of significant glandular distortion, acute cryptitis, granuloma, dysplasia, or malignancy is identified.

## 2024-12-04 NOTE — TELEPHONE ENCOUNTER
Dr. Barahona,    Patient due for 1 year recall EGD in 02/2025.    Please order if appropriate, thank you.

## 2024-12-12 NOTE — TELEPHONE ENCOUNTER
Dr. Barahona,    Per notes below patient called to follow up. Please provide EGD orders, thank you.

## 2024-12-15 NOTE — PROGRESS NOTES
Suzanne Franco is a 69 year old female.    CC:    Chief Complaint   Patient presents with    Full Skin Exam     New Patient presents for a Full Body Skin Exam. Pt denies any particular lesions or areas of concern. Pt has a psoriasis patch on the Left elbow and slightly on the Right elbow x several years. Pt has been using Calcipotriene-Betameth Diprop 0.005-0.064 % External Suspension as needed with flaring.   Pt denies any personal or family Hx of skin ca.         HISTORY:    Past Medical History:    Lopez esophagus    repeat EGD in 1 year    Essential hypertension    High blood pressure    High cholesterol    Hyperlipidemia    Screen for colon cancer    repeat in 10 years    Visual impairment      Past Surgical History:   Procedure Laterality Date    Colonoscopy  2024    Tonsillectomy      at age 1      Family History   Problem Relation Age of Onset    Cancer Father 72        bladder cancer      Social History     Socioeconomic History    Marital status:    Tobacco Use    Smoking status: Former     Current packs/day: 0.00     Types: Cigarettes     Quit date: 1978     Years since quittin.9    Smokeless tobacco: Never   Vaping Use    Vaping status: Never Used   Substance and Sexual Activity    Alcohol use: Yes     Alcohol/week: 2.0 standard drinks of alcohol     Types: 2 Standard drinks or equivalent per week     Comment: weekends    Drug use: Never   Other Topics Concern    Reaction to local anesthetic No    Pt has a pacemaker No    Pt has a defibrillator No        Current Outpatient Medications   Medication Sig Dispense Refill    losartan 50 MG Oral Tab Take 1 tablet (50 mg total) by mouth daily. 90 tablet 3    pantoprazole 40 MG Oral Tab EC Take 1 tablet (40 mg total) by mouth every morning before breakfast. 90 tablet 3    atorvastatin 20 MG Oral Tab Take 1 tablet (20 mg total) by mouth daily.      Calcipotriene-Betameth Diprop 0.005-0.064 % External Suspension Apply 1 Application topically  daily.      Multiple Vitamins-Minerals (WOMENS MULTI VITAMIN & MINERAL) Oral Tab Take by mouth.      cholecalciferol 50 MCG (2000 UT) Oral Cap Take 1 capsule (2,000 Units total) by mouth daily.      Omega-3 Fatty Acids (OMEGA-3 FISH OIL) 1000 MG Oral Cap Take by mouth.      aspirin 81 MG Oral Tab EC Take 1 tablet (81 mg total) by mouth daily.      cyanocobalamin 100 MCG Oral Tab Take 2.5 tablets (250 mcg total) by mouth daily.       Allergies:   Allergies[1]    Past Medical History:    Lopez esophagus    repeat EGD in 1 year    Essential hypertension    High blood pressure    High cholesterol    Hyperlipidemia    Screen for colon cancer    repeat in 10 years    Visual impairment     Past Surgical History:   Procedure Laterality Date    Colonoscopy  2024    Tonsillectomy      at age 1     Social History     Socioeconomic History    Marital status:      Spouse name: Not on file    Number of children: Not on file    Years of education: Not on file    Highest education level: Not on file   Occupational History    Not on file   Tobacco Use    Smoking status: Former     Current packs/day: 0.00     Types: Cigarettes     Quit date: 1978     Years since quittin.9    Smokeless tobacco: Never   Vaping Use    Vaping status: Never Used   Substance and Sexual Activity    Alcohol use: Yes     Alcohol/week: 2.0 standard drinks of alcohol     Types: 2 Standard drinks or equivalent per week     Comment: weekends    Drug use: Never    Sexual activity: Not on file   Other Topics Concern    Grew up on a farm Not Asked    History of tanning Not Asked    Outdoor occupation Not Asked    Breast feeding Not Asked    Reaction to local anesthetic No    Pt has a pacemaker No    Pt has a defibrillator No   Social History Narrative    Not on file     Social Drivers of Health     Financial Resource Strain: Not on file   Food Insecurity: Not on file   Transportation Needs: Not on file   Physical Activity: Not on file   Stress:  Not on file   Social Connections: Not on file   Housing Stability: Not on file     Family History   Problem Relation Age of Onset    Cancer Father 72        bladder cancer       HPI:     HPI:  Chief Complaint   Patient presents with    Full Skin Exam     New Patient presents for a Full Body Skin Exam. Pt denies any particular lesions or areas of concern. Pt has a psoriasis patch on the Left elbow and slightly on the Right elbow x several years. Pt has been using Calcipotriene-Betameth Diprop 0.005-0.064 % External Suspension as needed with flaring.   Pt denies any personal or family Hx of skin ca.       Patient with history of psoriasis    No personal  or family history of skin cancer    Patient presents with concerns above.    Patient has been in their usual state of health.     Past notes/ records and appropriate/relevant lab results including pathology and past body maps reviewed. Including outside notes/ PCP notes as appropriate. Updated and new information noted in current visit.     ROS:  Denies other relevant systemic complaints. See HPI.     History, medications, allergies reviewed as noted.    Allergies:  Patient has no known allergies.      There were no vitals filed for this visit.    Physical Examination:     Well-developed well-nourished patient alert oriented in no acute distress.  Exam performed of appropriate involved areas    Multiple light to medium brown, well marginated, uniformly pigmented, macules and papules 6 mm and less scattered on exam. pigmented lesions examined with dermoscopy benign-appearing patterns.     Waxy tannish keratotic papules scattered, cherry-red vascular papules scattered.    See map today's date for lesions noted .  See assessment and plan below for specific lesions.    Otherwise remarkable for lesions as noted on map.    See A/P  below for additional information:    Assessment / plan:    No orders of the defined types were placed in this encounter.      Meds & Refills for  this Visit:  Requested Prescriptions      No prescriptions requested or ordered in this encounter         Encounter Diagnoses   Name Primary?    Psoriasis vulgaris Yes    SK (seborrheic keratosis)     Lentigo     Multiple nevi     Benign neoplasm of skin, unspecified location        Psoriasis patches over the forearms in particular history of psoriasis on scalp currently clear   Psoriasis.  Plaque-type.  10% body surface involvement.  Will treat with medications and grid.  Overall skincare, liberal use of emollients discussed.  Consider more aggressive therapy if worsening.Patient will let us know how they are doing over the next several weeks.  Await clinical response to above therapy.  Recheck in 2-3 months if no improvement.  Continue Taclonex.  Patient has at home refills needed    Patient complaining of hair loss using over-the-counter shampoo currently  More diffuse decreased density  Recent labs reviewed noncontributory normal glucose no anemia  Overall most consistent with pattern, age-related loss.  Has noted improvement with her current shampoo we will continue consider adding minoxidil topical    Splotchy erythema over the nasal dorsum increase sunscreen early actinic keratoses.  Sun damage, concerning changes.  Recheck at follow-up.    Multiple lentigines keratoses transient was noted on the especially left upper arm  Benign-appearing nevi, no atypical features on dermoscopy reassurance given monitor.     Waxy tan keratotic papules lesions in areas of concern as noted reassurance given.  Benign nature discussed.  Possibility of cryo, alphahydroxy acids over-the-counter retinol's discussed.     No other susupicious lesions on todays  exam.    Please refer to map for specific lesions.  See additional diagnoses.  Pros cons of various therapies, risks benefits discussed.Pathophysiology, terapeutic options reviewed.  See  Medications in grid.  Instructions reviewed at length.    Benign nevi, seborrheic   keratoses, cherry angiomas:  Reassurance regarding other benign skin lesions.    Monitor for new or changing lesions. Signs and symptoms of skin cancer, ABCDE's of melanoma ( additional information available at AAD.org, skincancer.org) Encourage Sunscreen (broad-spectrum, ideally mineral-based-UVA/UVB -SPF 30 or higher) use encouraged, sun protection/sun protective clothing, self exams reviewed Followup as noted RTC ---routine checkup 6 mos -one year or p.r.n.    Encounter Times   Including precharting, reviewing chart, prior notes obtaining history: 10 minutes, medical exam :10 minutes, notes on body map, plan, counseling 10minutes My total time spent caring for the patient on the day of the encounter: 30 minutes     The patient indicates understanding of these issues and agrees to the plan.  The patient is asked to return as noted in follow-up/ above.    This note was generated using Dragon voice recognition software.  Please contact me regarding any confusion resulting from errors in recognition..  Note to patient and family: The 21st Century Cures Act makes medical notes like these available to patients. However, be advised this is a medical document. It is intended as [id number] communication and monitoring of a patient's care needs. It is written in medical language and may contain abbreviations or verbiage that are unfamiliar. It may appear blunt or direct. Medical documents are intended to carry relevant information, facts as evident and the clinical opinion of the practitioner.       [1] No Known Allergies

## 2024-12-19 NOTE — TELEPHONE ENCOUNTER
Scheduled for: EGD [id number]  Provider Name:  Dr. Barahona   Date:  3/7/2025  Location:    [facility]  Sedation:  mac  Time:  2:30 (pt is aware that [facility] will call the day before to confirm arrival time)  Prep:  NPO after midnight   Meds/Allergies Reconciled?:  Physician reviewed   Diagnosis with codes:  Lopez's esophagus  K22.70  Was patient informed to call insurance with codes (Y/N):  Yes, I confirmed Medicare insurance with the patient.   Referral sent?:  Referral was sent at the time of electronic surgical scheduling.  EM or [facility] notified?:  I sent an electronic request to Endo Scheduling and received a confirmation today.   Medication Orders:  This patient verbally confirmed that she is not taking:   Iron, blood thinners, BP meds, and is not diabetic   Not latex allergy, Not PCN allergy and does not have a pacemaker  Misc Orders:  I discussed the prep instructions with the patient which she verbally understood and is aware that I will mychart the instructions today.       Further instructions given by staff:

## 2024-12-19 NOTE — TELEPHONE ENCOUNTER
GI Scheduling Staff:     Please schedule: EGD with MAC     Diagnosis: Lopez's esophagus    Medication adjustments:  Day of procedure, hold: none    >>>Please inform patient if new medications are started after scheduling procedure they need to call clinic to notify us.

## 2025-03-07 ENCOUNTER — TELEPHONE (OUTPATIENT)
Facility: CLINIC | Age: 70
End: 2025-03-07

## 2025-03-07 PROBLEM — K22.70 BARRETT'S ESOPHAGUS WITHOUT DYSPLASIA: Status: ACTIVE | Noted: 2025-03-07

## 2025-03-07 PROCEDURE — 88312 SPECIAL STAINS GROUP 1: CPT | Performed by: INTERNAL MEDICINE

## 2025-03-07 PROCEDURE — 88305 TISSUE EXAM BY PATHOLOGIST: CPT | Performed by: INTERNAL MEDICINE

## 2025-03-07 RX ORDER — PANTOPRAZOLE SODIUM 40 MG/1
40 TABLET, DELAYED RELEASE ORAL
Qty: 90 TABLET | Refills: 3 | Status: SHIPPED | OUTPATIENT
Start: 2025-03-07 | End: 2026-03-02

## 2025-03-14 ENCOUNTER — TELEPHONE (OUTPATIENT)
Facility: CLINIC | Age: 70
End: 2025-03-14

## 2025-03-14 NOTE — TELEPHONE ENCOUNTER
Last EGD done 03/2025. 3 year recall placed into Pt Outreach, next due on 03/2028 per Dr. Barahona. Specialty comments updated.

## 2025-03-14 NOTE — TELEPHONE ENCOUNTER
Distal esophagus; biopsy:   Fragments of squamocolumnar mucosa with mild chronic inflammation, features of reflux and focal intestinal metaplasia, compatible with clinical impression of Robert's esophagus.    No evidence of dysplasia or malignancy identified.    Stable robert's, no dysplasia.    GI RN Staff:   Recall EGD In 3 yr

## 2025-03-19 ENCOUNTER — HOSPITAL ENCOUNTER (OUTPATIENT)
Dept: BONE DENSITY | Age: 70
Discharge: HOME | End: 2025-03-19
Attending: FAMILY MEDICINE
Payer: MEDICARE

## 2025-03-19 DIAGNOSIS — Z78.0 POSTMENOPAUSAL: ICD-10-CM

## 2025-03-19 DIAGNOSIS — M85.852 OSTEOPENIA OF NECK OF LEFT FEMUR: ICD-10-CM

## 2025-03-19 PROCEDURE — 77080 DXA BONE DENSITY AXIAL: CPT | Performed by: FAMILY MEDICINE

## 2025-08-10 RX ORDER — ATORVASTATIN CALCIUM 20 MG/1
20 TABLET, FILM COATED ORAL DAILY
Qty: 90 TABLET | Refills: 1 | Status: SHIPPED | OUTPATIENT
Start: 2025-08-10

## (undated) NOTE — LETTER
11/27/2024    Suzanne Franco        [address] [address]            Dear Suzanne Franco,      Our records indicate that you are due for an appointment for an EGD or Upper Endoscopy with MARYA Barahona MD. Our doctors are booking out about 3-6 months in advance for procedures.     Please call our office to schedule a phone screening appointment to plan for the procedure(s).   Your medical well-being is important to us.    If your insurance requires a referral, please call your primary care office to request one.      Thank you,      The Physicians and Staff at [facility]